# Patient Record
Sex: MALE | Race: WHITE
[De-identification: names, ages, dates, MRNs, and addresses within clinical notes are randomized per-mention and may not be internally consistent; named-entity substitution may affect disease eponyms.]

---

## 2018-04-19 ENCOUNTER — HOSPITAL ENCOUNTER (EMERGENCY)
Dept: HOSPITAL 63 - ER | Age: 33
Discharge: ANOTHER HOSPITAL | End: 2018-04-19
Payer: COMMERCIAL

## 2018-04-19 VITALS — BODY MASS INDEX: 42.24 KG/M2 | WEIGHT: 253.53 LBS | HEIGHT: 65 IN

## 2018-04-19 VITALS — DIASTOLIC BLOOD PRESSURE: 76 MMHG | SYSTOLIC BLOOD PRESSURE: 112 MMHG

## 2018-04-19 DIAGNOSIS — K56.690: Primary | ICD-10-CM

## 2018-04-19 LAB
ALBUMIN SERPL-MCNC: 3.6 G/DL (ref 3.4–5)
ALBUMIN/GLOB SERPL: 0.9 {RATIO} (ref 1–1.7)
ALP SERPL-CCNC: 53 U/L (ref 46–116)
ALT SERPL-CCNC: 53 U/L (ref 16–63)
ANION GAP SERPL CALC-SCNC: 7 MMOL/L (ref 6–14)
APTT PPP: YELLOW S
AST SERPL-CCNC: 26 U/L (ref 15–37)
BACTERIA #/AREA URNS HPF: 0 /HPF
BASOPHILS # BLD AUTO: 0 X10^3/UL (ref 0–0.2)
BASOPHILS NFR BLD: 0 % (ref 0–3)
BILIRUB SERPL-MCNC: 0.5 MG/DL (ref 0.2–1)
BILIRUB UR QL STRIP: (no result)
BUN/CREAT SERPL: 12 (ref 6–20)
CA-I SERPL ISE-MCNC: 15 MG/DL (ref 8–26)
CALCIUM SERPL-MCNC: 9.2 MG/DL (ref 8.5–10.1)
CHLORIDE SERPL-SCNC: 102 MMOL/L (ref 98–107)
CO2 SERPL-SCNC: 31 MMOL/L (ref 21–32)
CREAT SERPL-MCNC: 1.3 MG/DL (ref 0.7–1.3)
EOSINOPHIL NFR BLD: 1.9 X10^3/UL (ref 0–0.7)
EOSINOPHIL NFR BLD: 16 % (ref 0–3)
ERYTHROCYTE [DISTWIDTH] IN BLOOD BY AUTOMATED COUNT: 13.1 % (ref 11.5–14.5)
FIBRINOGEN PPP-MCNC: CLEAR MG/DL
GFR SERPLBLD BASED ON 1.73 SQ M-ARVRAT: 64 ML/MIN
GLOBULIN SER-MCNC: 3.9 G/DL (ref 2.2–3.8)
GLUCOSE SERPL-MCNC: 124 MG/DL (ref 70–99)
GLUCOSE UR STRIP-MCNC: (no result) MG/DL
HCT VFR BLD CALC: 47.9 % (ref 39–53)
HGB BLD-MCNC: 16.3 G/DL (ref 13–17.5)
LIPASE: 157 U/L (ref 73–393)
LYMPHOCYTES # BLD: 2.2 X10^3/UL (ref 1–4.8)
LYMPHOCYTES NFR BLD AUTO: 18 % (ref 24–48)
MCH RBC QN AUTO: 29 PG (ref 25–35)
MCHC RBC AUTO-ENTMCNC: 34 G/DL (ref 31–37)
MCV RBC AUTO: 85 FL (ref 79–100)
MONO #: 0.8 X10^3/UL (ref 0–1.1)
MONOCYTES NFR BLD: 7 % (ref 0–9)
NEUT #: 7.1 X10^3UL (ref 1.8–7.7)
NEUTROPHILS NFR BLD AUTO: 59 % (ref 31–73)
NITRITE UR QL STRIP: (no result)
PLATELET # BLD AUTO: 187 X10^3/UL (ref 140–400)
POTASSIUM SERPL-SCNC: 3.8 MMOL/L (ref 3.5–5.1)
PROT SERPL-MCNC: 7.5 G/DL (ref 6.4–8.2)
RBC # BLD AUTO: 5.66 X10^6/UL (ref 4.3–5.7)
RBC #/AREA URNS HPF: 0 /HPF (ref 0–2)
SODIUM SERPL-SCNC: 140 MMOL/L (ref 136–145)
SP GR UR STRIP: >=1.03
SQUAMOUS #/AREA URNS LPF: (no result) /LPF
UROBILINOGEN UR-MCNC: 0.2 MG/DL
WBC # BLD AUTO: 12 X10^3/UL (ref 4–11)
WBC #/AREA URNS HPF: (no result) /HPF (ref 0–4)

## 2018-04-19 PROCEDURE — 81001 URINALYSIS AUTO W/SCOPE: CPT

## 2018-04-19 PROCEDURE — 83690 ASSAY OF LIPASE: CPT

## 2018-04-19 PROCEDURE — 99285 EMERGENCY DEPT VISIT HI MDM: CPT

## 2018-04-19 PROCEDURE — 80053 COMPREHEN METABOLIC PANEL: CPT

## 2018-04-19 PROCEDURE — 74177 CT ABD & PELVIS W/CONTRAST: CPT

## 2018-04-19 PROCEDURE — 83605 ASSAY OF LACTIC ACID: CPT

## 2018-04-19 PROCEDURE — 96361 HYDRATE IV INFUSION ADD-ON: CPT

## 2018-04-19 PROCEDURE — 36415 COLL VENOUS BLD VENIPUNCTURE: CPT

## 2018-04-19 PROCEDURE — 85025 COMPLETE CBC W/AUTO DIFF WBC: CPT

## 2018-04-19 PROCEDURE — 96374 THER/PROPH/DIAG INJ IV PUSH: CPT

## 2018-04-19 RX ADMIN — ONDANSETRON ONE MG: 2 INJECTION INTRAMUSCULAR; INTRAVENOUS at 01:44

## 2018-04-19 RX ADMIN — SODIUM CHLORIDE ONE MLS/HR: 0.9 INJECTION, SOLUTION INTRAVENOUS at 01:44

## 2018-04-19 RX ADMIN — IOHEXOL ONE ML: 300 INJECTION, SOLUTION INTRAVENOUS at 01:17

## 2018-04-19 RX ADMIN — SODIUM CHLORIDE ONE MLS/HR: 0.9 INJECTION, SOLUTION INTRAVENOUS at 02:51

## 2018-04-19 NOTE — PHYS DOC
Adult General


HPI


HPI





Patient is a 32 year old M who presents with right lower quadrant abdominal 

pain that started tonight. Patient states the pain started periumbilical and 

has shooting pain to the right lower quadrant. Patient states that for the past 

2 weeks he's had diarrhea with abdominal cramps however that stopped for 

approximately 3 days and then tonight the pain started. Patient is nauseous 

with no vomiting. Patient denies any fevers. Patient denies any previous 

abdominal surgeries. Patient denies any chest pain returns of breath. Patient 

is no other complaints.





Review of Systems


Review of Systems


GEN: Denies fevers, chills, sweats


HEENT: Denies blurred vision, sore throat


CV: Denies chest pain


RESP: Denies shortness of air, cough


GI: Right lower quadrant abdominal pain with nausea


NEURO: Denies confusion, dizziness


MSK: Denies weakness, joint pain/swelling





All other systems were reviewed and found to be within normal limits, except as 

documented in this note.





Current Medications


Current Medications





Current Medications








 Medications


  (Trade)  Dose


 Ordered  Sig/Jaki  Start Time


 Stop Time Status Last Admin


Dose Admin


 


 Ondansetron HCl


  (Zofran)  4 mg  1X  ONCE  4/19/18 00:45


 4/19/18 00:46 UNV  


 


 


 Sodium Chloride  1,000 ml @ 


 1,000 mls/hr  1X  ONCE  4/19/18 00:45


 4/19/18 01:44 UNV  


 











Physical Exam


Physical Exam


GEN.:    No apparent distress.  Alert and oriented.


HEENT:    Head is normocephalic, atraumatic


NECK:    Supple.  


LUNGS:    CTAB.


HEART:    RRR, S1, S2 present.  Peripheral pulses intact


ABDOMEN:    Soft, tenderness palpation periumbilical and right lower quadrant 

tenderness to palpation with no rebound, no guarding, no abdominal distention.  

Positive bowel sounds.


EXTREMITIES:    Without any cyanosis.    


NEUROLOGIC:     Normal speech, normal tone


PSYCHIATRIC:    Normal affect, normal mood.


SKIN:   No ulcerations





Current Patient Data


Vital Signs





Laboratory Tests








Test


  4/19/18


01:00 4/19/18


01:15


 


White Blood Count 12.0 x10^3/uL  


 


Red Blood Count 5.66 x10^6/uL  


 


Hemoglobin 16.3 g/dL  


 


Hematocrit 47.9 %  


 


Mean Corpuscular Volume 85 fL  


 


Mean Corpuscular Hemoglobin 29 pg  


 


Mean Corpuscular Hemoglobin


Concent 34 g/dL 


  


 


 


Red Cell Distribution Width 13.1 %  


 


Platelet Count 187 x10^3/uL  


 


Neutrophils (%) (Auto) 59 %  


 


Lymphocytes (%) (Auto) 18 %  


 


Monocytes (%) (Auto) 7 %  


 


Eosinophils (%) (Auto) 16 %  


 


Basophils (%) (Auto) 0 %  


 


Neutrophils # (Auto) 7.1 x10^3uL  


 


Lymphocytes # (Auto) 2.2 x10^3/uL  


 


Monocytes # (Auto) 0.8 x10^3/uL  


 


Eosinophils # (Auto) 1.9 x10^3/uL  


 


Basophils # (Auto) 0.0 x10^3/uL  


 


Sodium Level 140 mmol/L  


 


Potassium Level 3.8 mmol/L  


 


Chloride Level 102 mmol/L  


 


Carbon Dioxide Level 31 mmol/L  


 


Anion Gap 7  


 


Blood Urea Nitrogen 15 mg/dL  


 


Creatinine 1.3 mg/dL  


 


Estimated GFR


(Cockcroft-Gault) 64.0 


  


 


 


BUN/Creatinine Ratio 12  


 


Glucose Level 124 mg/dL  


 


Calcium Level 9.2 mg/dL  


 


Total Bilirubin 0.5 mg/dL  


 


Aspartate Amino Transf


(AST/SGOT) 26 U/L 


  


 


 


Alanine Aminotransferase


(ALT/SGPT) 53 U/L 


  


 


 


Alkaline Phosphatase 53 U/L  


 


Total Protein 7.5 g/dL  


 


Albumin 3.6 g/dL  


 


Albumin/Globulin Ratio 0.9  


 


Lipase 157 U/L  


 


Urine Collection Type  Unknown 


 


Urine Color  Yellow 


 


Urine Clarity  Clear 


 


Urine pH  5.5 


 


Urine Specific Gravity  >=1.030 


 


Urine Protein  Neg 


 


Urine Glucose (UA)  Neg mg/dL 


 


Urine Ketones (Stick)  Neg mg/dL 


 


Urine Blood  Neg 


 


Urine Nitrite  Neg 


 


Urine Bilirubin  Neg 


 


Urine Urobilinogen Dipstick  0.2 mg/dL 


 


Urine Leukocyte Esterase  Neg 


 


Urine RBC  0 /HPF 


 


Urine WBC  Occ /HPF 


 


Urine Squamous Epithelial


Cells 


  None /LPF 


 


 


Urine Bacteria  0 /HPF 








Current Medications








 Medications


  (Trade)  Dose


 Ordered  Sig/Jaki


 Route


 PRN Reason  Start Time


 Stop Time Status Last Admin


Dose Admin


 


 Sodium Chloride  1,000 ml @ 


 1,000 mls/hr  1X  ONCE


 IV


   4/19/18 01:00


 4/19/18 02:00 DC 4/19/18 01:44


 


 


 Ondansetron HCl


  (Zofran)  4 mg  1X  ONCE


 IV


   4/19/18 01:00


 4/19/18 01:01 DC 4/19/18 01:44


 


 


 Iohexol


  (Omnipaque 300


 Mg/ml)  75 ml  1X  ONCE


 IV


   4/19/18 01:00


 4/19/18 01:01 DC 4/19/18 01:17


 


 


 Info


  (Do NOT chart on


 this entry -- for


 MONITORING)  1 each  PRN DAILY  PRN


 [facility]


 SEE COMMENTS  4/19/18 01:00


 4/21/18 00:59   


 











EKG


EKG


[]





Radiology/Procedures


Radiology/Procedures


CT Abd and Pelvis:


IMPRESSION:


 


No evidence of appendicitis or hydronephrosis.


 


There are some mildly dilated loops of small bowel in the upper abdomen 


with more distal decompression. This is a mild finding with the loops are 


only slightly dilated but would correlate with symptoms in the region to 


ensure that there is not a pathologic cause such as a partial small bowel 


obstruction. This could also  just be secondary to the phase of 


peristalsis.[]





Course & Med Decision Making


Course & Med Decision Making


Pertinent Labs and Imaging studies reviewed. (See chart for details)





ED course:


Patient was seen and examined emergency room basic blood work was ordered A CT 

scan abdomen pelvis


On reexamination patient still having periumbilical pain with right lower 

quadrant.


I made patient aware of CT findings and plan to place in observation for 

possible early appendicitis and a partial small bowel obstruction


0213: Discussed CC/HP/PMH with Dr. Whaley and recommends admit 


0230: Discussed CC/HP/PMH with Dr. Brandt and recommends admit and would like a 

lactic acid checked on the patient[][]





MDM:


After reviewing the chart, CC/HPI/PMH, physical exam, [lab results], [

radiological results], I have concerns base of the patient's physical exam 

findings and CT findings he could have early appendicitis or early partial 

small bowel junction therefore will admit the patient for observation at 

[facility] to medicine with surgery on consult. 


 





[]





Dragon Disclaimer


Dragon Disclaimer


This electronic medical record was generated, in whole or in part, using a 

voice recognition dictation system.





Departure


Departure:


Impression:  


 Primary Impression:  


 Right lower quadrant abdominal pain


 Additional Impression:  


 Partial small bowel obstruction


Disposition:  02 XFER [facility]OSP (Dr Whaley accepting )


Condition:  STABLE


Referrals:  


HAMMAD ORTIZ MD (PCP)





Problem Qualifiers











LEONARDO ALEGRIA DO Apr 19, 2018 00:45

## 2018-04-19 NOTE — RAD
INDICATION: RLQ Pain and tenderness, diarrhea<BR>Gave Omni 300 75ml IV - 

tolerated well<BR>no previous for comparison<BR>no hx of any abdominal 

surgeries or medical conditions

 

COMPARISON: None.

 

TECHNIQUE:

 

Axial CT images obtained through the abdomen and pelvis with contrast.

 

One or more of the following individualized dose reduction techniques were

utilized for this examination:  1. Automated exposure control;  2. 

Adjustment of the mA and/or kV according to patient size;  3. Use of 

iterative reconstruction technique.

 

FINDINGS:

 

Abdominal aorta is not aneurysmal.

Liver is low-attenuation. Fatty infiltration is possible

No peripancreatic fluid collection.

Spleen unremarkable.

No left-sided hydronephrosis.

Urinary bladder is decompressed.

No right-sided hydronephrosis.

The appendix is not inflamed.

There is some degenerative changes the spine with disc osteophyte 

complexes.

Very mild wedging of T10 and T11 vertebral bodies although these could be 

related to degenerative changes or congenital.

There are some prominent loops of small bowel within the upper abdomen 

measuring up to about 27 mm.

 

IMPRESSION:

 

No evidence of appendicitis or hydronephrosis.

 

There are some mildly dilated loops of small bowel in the upper abdomen 

with more distal decompression. This is a mild finding with the loops are 

only slightly dilated but would correlate with symptoms in the region to 

ensure that there is not a pathologic cause such as a partial small bowel 

obstruction. This could also  just be secondary to the phase of 

peristalsis.

 

Electronically signed by: Ashok Chakraborty MD (4/19/2018 1:59 AM) [facility]

## 2018-04-20 VITALS — SYSTOLIC BLOOD PRESSURE: 116 MMHG | DIASTOLIC BLOOD PRESSURE: 70 MMHG

## 2018-11-01 ENCOUNTER — HOSPITAL ENCOUNTER (OUTPATIENT)
Dept: HOSPITAL 61 - MRI | Age: 33
Discharge: HOME | End: 2018-11-01
Attending: INTERNAL MEDICINE
Payer: COMMERCIAL

## 2018-11-01 DIAGNOSIS — K76.0: Primary | ICD-10-CM

## 2018-11-01 DIAGNOSIS — Z90.49: ICD-10-CM

## 2018-11-01 PROCEDURE — 74181 MRI ABDOMEN W/O CONTRAST: CPT

## 2018-11-01 NOTE — RAD
MRI Of The Abdomen Without Intravenous Contrast:

 

History:  Abdominal pain status post cholecystectomy in June 2018.

 

Comparison:  None.

 

Technique: MRI of the abdomen was performed without intravenous contrast 

using multiple sequences and planes including 3-D respiratory triggered 

coronal MRCP sequence.  Rotating 3-D MIPS were created from the source 

MRCP data.  

 

Findings:

 

Severe fatty liver disease is seen.  No focal hepatic mass is identified.

Spleen is unremarkable as are bilateral adrenal glands.  Bilateral kidneys

are unremarkable.

 

Gallbladder is absent.  No biliary dilatation is identified.  Common bile 

duct has normal caliber at 3-4 mm.  There may be pancreas divisum.  No 

pancreatic inflammation is identified.

 

Impression:

1.  Status post cholecystectomy.

2.  No evidence of choledocholithiasis or biliary obstruction.

3.  Question pancreas divisum.

4.  Severe fatty liver disease.

 

Electronically signed by: Morteza Mcmahon MD (11/1/2018 4:07 PM) [facility]

## 2018-11-16 ENCOUNTER — HOSPITAL ENCOUNTER (OUTPATIENT)
Dept: HOSPITAL 61 - ENDOS | Age: 33
Discharge: HOME | End: 2018-11-16
Attending: INTERNAL MEDICINE
Payer: COMMERCIAL

## 2018-11-16 VITALS
SYSTOLIC BLOOD PRESSURE: 123 MMHG | DIASTOLIC BLOOD PRESSURE: 70 MMHG | SYSTOLIC BLOOD PRESSURE: 123 MMHG | DIASTOLIC BLOOD PRESSURE: 70 MMHG

## 2018-11-16 DIAGNOSIS — K21.0: Primary | ICD-10-CM

## 2018-11-16 DIAGNOSIS — Z83.79: ICD-10-CM

## 2018-11-16 DIAGNOSIS — Z90.49: ICD-10-CM

## 2018-11-16 DIAGNOSIS — Z72.89: ICD-10-CM

## 2018-11-16 DIAGNOSIS — Z98.890: ICD-10-CM

## 2018-11-16 DIAGNOSIS — Z88.2: ICD-10-CM

## 2018-11-16 DIAGNOSIS — Z79.899: ICD-10-CM

## 2018-11-16 DIAGNOSIS — Z88.0: ICD-10-CM

## 2018-11-16 DIAGNOSIS — Z83.3: ICD-10-CM

## 2018-11-16 LAB — HBA1C MFR BLD: 5.6 % (ref 4.8–5.6)

## 2018-11-16 PROCEDURE — 83036 HEMOGLOBIN GLYCOSYLATED A1C: CPT

## 2018-11-16 PROCEDURE — 82947 ASSAY GLUCOSE BLOOD QUANT: CPT

## 2018-11-16 PROCEDURE — 88305 TISSUE EXAM BY PATHOLOGIST: CPT

## 2018-11-16 PROCEDURE — 36415 COLL VENOUS BLD VENIPUNCTURE: CPT

## 2018-11-16 PROCEDURE — 43239 EGD BIOPSY SINGLE/MULTIPLE: CPT

## 2018-11-16 NOTE — CONS
DATE OF CONSULTATION:  11/16/2018



REFERRING PHYSICIAN:  Rinku Pimentel MD.



HISTORY OF PRESENT ILLNESS:  A 33-year-old  male with past medical

history significant for IBS, diarrhea, right upper quadrant abdominal pain,

status post cholecystectomy, is seen with ongoing pain since his cholecystectomy

and diarrhea has been improved by avoiding fatty and greasy foods.  With

continued symptoms, upper endoscopy was recommended to further assess.  There is

no family history of celiac disease, but there is a history of ulcerative

colitis with his mother and grandfather.  With continued symptoms, he is here

today.



PAST MEDICAL HISTORY:  IBS, gastroesophageal reflux disease, diarrhea and

abdominal pain.



ALLERGIES:  PENICILLIN AND SULFA.



MEDICATIONS:  Include Pepto-Bismol, Zyrtec, ibuprofen, loperamide.



FAMILY AND SOCIAL HISTORY:  Significant for diabetes with her mother and

ulcerative colitis with mother and grandfather.  He is a social drinker,

nonsmoker.



PAST SURGICAL HISTORY:  Status post tonsillectomy, cholecystectomy.



REVIEW OF SYSTEMS:  Per records.



PHYSICAL EXAMINATION:

GENERAL:  Reveals a well-nourished, well-developed  male.

VITAL SIGNS:  Temperature is 98.1, pulse 80, respirations 20.

HEENT:  Normocephalic and atraumatic.  Pupils and extraocular muscles are not

tested.  Sclerae anicteric.

NECK:  Supple.

LUNGS:  Clear.

CARDIOVASCULAR:  Reveals an S1, S2 without S3, S4 or appreciable murmur.

ABDOMEN:  Reveals soft abdomen, normal bowel sounds without appreciable

hepatosplenomegaly.

EXTREMITIES:  Reveals no cyanosis, clubbing or edema.



IMPRESSION:  Right upper quadrant abdominal pain, status post cholecystectomy,

etiology is to be determined.  Differential includes peptic ulcer disease

secondary to end-stage celiac disease, gastroparesis and/or malignancy.  Risks

and benefits of the procedure were discussed with the patient who is wiling to

proceed at this time.

 



______________________________

SHAR MENSAH MD



DR:  MARIO/chantel  JOB#:  [id number] / [id number]

DD:  11/16/2018 10:25  DT:  11/16/2018 21:19

## 2018-11-19 NOTE — PATHOLOGY
[facility] Accession Number: [id number]

.                                                                01

Material submitted:                                        .

DISTAL ESOPHAGUS

.                                                                01

Clinical history:                                          .

Abdominal pain, reflux rule out Warren's.

.                                                                02

**********************************************************************

Diagnosis:

Esophageal biopsies, distal esophagus:

- Segments of hyperplastic squamous esophageal mucosa and esophagogastric

mucosa showing chronic inflammation, consistent with reflux esophagitis.

.

(JPM:mml; 11/19/18)

[facility] access hospital/11/19/2018

**********************************************************************

.                                                                02

Comment:

Sections of the distal esophageal biopsy reveal segments of

tangentially-oriented hyperplastic squamous esophageal mucosa and

esophagogastric mucosa showing moderate chronic inflammation.  The

findings are consistent with reflux esophagitis.  There is no evidence of

Warren's change, dysplasia, or malignancy.

.

(JPM:mml; 11/19/18)

.                                                                02

Electronically signed:                                     .

Jhoan Peace MD, Pathologist

NPI- [id number]

.                                                                01

Gross description:                                         .

Received in formalin labeled "Shravan Brewer, distal esophagus" is a 0.8 x

0.6 x 0.2 cm aggregate of tan-white soft tissue fragments.  The specimen

is submitted in cassette A1.

([facility]; 11/17/2018)

SY/SY

.                                                                02

Pathologist provided ICD-10:

K21.0

.                                                                02

CPT                                                        .

790376

Specimen Comment: A courtesy copy of this report has been sent to

Specimen Comment: [phone], [phone].

Specimen Comment: Report sent to   / DR ROJAS

Specimen Comment: A duplicate report has been generated due to demographic updates.

***Performed at:  01

   [address], [place], [place]  [id number]

   MD Norris Burton MD Phone:  [phone]

***Performed at:  02

   [facility]

   [address] [place] [address]  [id number]

   MD Jhoan Peace MD Phone:  [phone]

## 2018-11-30 ENCOUNTER — HOSPITAL ENCOUNTER (OUTPATIENT)
Dept: HOSPITAL 61 - NM | Age: 33
Discharge: HOME | End: 2018-11-30
Attending: INTERNAL MEDICINE
Payer: COMMERCIAL

## 2018-11-30 DIAGNOSIS — K30: Primary | ICD-10-CM

## 2018-11-30 PROCEDURE — A9541 TC99M SULFUR COLLOID: HCPCS

## 2018-11-30 PROCEDURE — 78264 GASTRIC EMPTYING IMG STUDY: CPT

## 2018-11-30 NOTE — RAD
EXAM: Nuclear gastric emptying scan.

 

HISTORY: Right upper quadrant pain.

 

COMPARISON: None.

 

TECHNIQUE: Serial static images were obtained over the stomach following 

oral administration of 2.0 mCi of 99m-Tc sulfur colloid.

 

FINDINGS: The stomach empties into the small bowel without evidence of 

reflux in the area of the esophagus. The estimated time for half emptying 

of gastric contents, i.e. 'gastric emptying time' is 112 minutes (normal 

is 66 +/- 22 minutes). There is 73 percent retained tracer activity within

the stomach at one hour, 45 percent retained tracer activity within the 

stomach at 2 hours, 4 percent retained tracer activity within the stomach 

at 3 hours and 0 percent retained tracer activity within the stomach at 4 

hours.

 

IMPRESSION: Slightly delayed gastric emptying with a half-time of 112 

minutes.

 

Electronically signed by: Linda Bowens MD (11/30/2018 12:59 PM) 

[facility]

## 2019-02-28 ENCOUNTER — HOSPITAL ENCOUNTER (EMERGENCY)
Dept: HOSPITAL 61 - ER | Age: 34
Discharge: HOME | End: 2019-02-28
Payer: COMMERCIAL

## 2019-02-28 VITALS — WEIGHT: 260 LBS | HEIGHT: 66 IN | BODY MASS INDEX: 41.78 KG/M2

## 2019-02-28 VITALS — SYSTOLIC BLOOD PRESSURE: 128 MMHG | DIASTOLIC BLOOD PRESSURE: 76 MMHG

## 2019-02-28 DIAGNOSIS — Z88.2: ICD-10-CM

## 2019-02-28 DIAGNOSIS — Z88.0: ICD-10-CM

## 2019-02-28 DIAGNOSIS — K52.9: Primary | ICD-10-CM

## 2019-02-28 LAB
ALBUMIN SERPL-MCNC: 3.5 G/DL (ref 3.4–5)
ALBUMIN/GLOB SERPL: 0.8 {RATIO} (ref 1–1.7)
ALP SERPL-CCNC: 53 U/L (ref 46–116)
ALT SERPL-CCNC: 63 U/L (ref 16–63)
ANION GAP SERPL CALC-SCNC: 9 MMOL/L (ref 6–14)
APTT PPP: YELLOW S
AST SERPL-CCNC: 33 U/L (ref 15–37)
BACTERIA #/AREA URNS HPF: 0 /HPF
BASOPHILS # BLD AUTO: 0 X10^3/UL (ref 0–0.2)
BASOPHILS NFR BLD: 0 % (ref 0–3)
BILIRUB SERPL-MCNC: 0.5 MG/DL (ref 0.2–1)
BILIRUB UR QL STRIP: NEGATIVE
BUN SERPL-MCNC: 15 MG/DL (ref 8–26)
BUN/CREAT SERPL: 13 (ref 6–20)
CALCIUM SERPL-MCNC: 8.8 MG/DL (ref 8.5–10.1)
CHLORIDE SERPL-SCNC: 101 MMOL/L (ref 98–107)
CO2 SERPL-SCNC: 30 MMOL/L (ref 21–32)
CREAT SERPL-MCNC: 1.2 MG/DL (ref 0.7–1.3)
EOSINOPHIL NFR BLD: 0.3 X10^3/UL (ref 0–0.7)
EOSINOPHIL NFR BLD: 3 % (ref 0–3)
ERYTHROCYTE [DISTWIDTH] IN BLOOD BY AUTOMATED COUNT: 12.9 % (ref 11.5–14.5)
FIBRINOGEN PPP-MCNC: CLEAR MG/DL
GFR SERPLBLD BASED ON 1.73 SQ M-ARVRAT: 69.7 ML/MIN
GLOBULIN SER-MCNC: 4.2 G/DL (ref 2.2–3.8)
GLUCOSE SERPL-MCNC: 85 MG/DL (ref 70–99)
HCT VFR BLD CALC: 43.7 % (ref 39–53)
HGB BLD-MCNC: 14.7 G/DL (ref 13–17.5)
LIPASE: 98 U/L (ref 73–393)
LYMPHOCYTES # BLD: 1.5 X10^3/UL (ref 1–4.8)
LYMPHOCYTES NFR BLD AUTO: 20 % (ref 24–48)
MCH RBC QN AUTO: 29 PG (ref 25–35)
MCHC RBC AUTO-ENTMCNC: 34 G/DL (ref 31–37)
MCV RBC AUTO: 85 FL (ref 79–100)
MONO #: 0.8 X10^3/UL (ref 0–1.1)
MONOCYTES NFR BLD: 11 % (ref 0–9)
NEUT #: 4.8 X10^3UL (ref 1.8–7.7)
NEUTROPHILS NFR BLD AUTO: 65 % (ref 31–73)
NITRITE UR QL STRIP: NEGATIVE
PH UR STRIP: 5.5 [PH]
PLATELET # BLD AUTO: 181 X10^3/UL (ref 140–400)
POTASSIUM SERPL-SCNC: 3.9 MMOL/L (ref 3.5–5.1)
PROT SERPL-MCNC: 7.7 G/DL (ref 6.4–8.2)
PROT UR STRIP-MCNC: NEGATIVE MG/DL
RBC # BLD AUTO: 5.15 X10^6/UL (ref 4.3–5.7)
RBC #/AREA URNS HPF: 0 /HPF (ref 0–2)
SODIUM SERPL-SCNC: 140 MMOL/L (ref 136–145)
UROBILINOGEN UR-MCNC: 0.2 MG/DL
WBC # BLD AUTO: 7.4 X10^3/UL (ref 4–11)
WBC #/AREA URNS HPF: 0 /HPF (ref 0–4)

## 2019-02-28 PROCEDURE — 81001 URINALYSIS AUTO W/SCOPE: CPT

## 2019-02-28 PROCEDURE — 85025 COMPLETE CBC W/AUTO DIFF WBC: CPT

## 2019-02-28 PROCEDURE — 96361 HYDRATE IV INFUSION ADD-ON: CPT

## 2019-02-28 PROCEDURE — 74177 CT ABD & PELVIS W/CONTRAST: CPT

## 2019-02-28 PROCEDURE — 96375 TX/PRO/DX INJ NEW DRUG ADDON: CPT

## 2019-02-28 PROCEDURE — 83690 ASSAY OF LIPASE: CPT

## 2019-02-28 PROCEDURE — 80053 COMPREHEN METABOLIC PANEL: CPT

## 2019-02-28 PROCEDURE — 96374 THER/PROPH/DIAG INJ IV PUSH: CPT

## 2019-02-28 PROCEDURE — 36415 COLL VENOUS BLD VENIPUNCTURE: CPT

## 2019-02-28 PROCEDURE — 99284 EMERGENCY DEPT VISIT MOD MDM: CPT

## 2019-02-28 NOTE — PHYS DOC
Adult General


Chief Complaint


Chief Complaint:  ABDOMINAL PAIN





HPI


HPI





Patient is a 33-year-old male who presents with complaint of epigastric 

abdominal pain that started at about midnight tonight. Patient rates his pain 

to be a 7-8 out of 10. He reports nausea but is had no vomiting. He states that 

he has had a total of 10 bowel movements have been liquid. He denies seeing any 

blood in the stool. He describes pain in his abdomen as being cramping. He 

states that the pain does radiate to the right upper quadrant. He states that 

he did have his gallbladder out about 6 months ago. He denies any radiation 

into the back. Patient denies any fever, chest pain or shortness of breath.





Review of Systems


Review of Systems





Constitutional: Denies fever or chills []


Respiratory: Denies cough or shortness of breath []


Cardiovascular: No additional information not addressed in HPI []


GI: Complains of epigastric abdominal pain with nausea. Denies vomiting. 

Complains of diarrhea []


Musculoskeletal: Denies back pain or joint pain []





All other systems were reviewed and found to be within normal limits, except as 

documented in this note.





Current Medications


Current Medications





Current Medications








 Medications


  (Trade)  Dose


 Ordered  Sig/Jaki  Start Time


 Stop Time Status Last Admin


Dose Admin


 


 Fentanyl Citrate


  (Fentanyl 2ml


 Vial)  25 mcg  PRN Q15MIN  PRN  2/28/19 04:30


 3/1/19 04:29  2/28/19 04:49


25 MCG


 


 Info


  (CONTRAST GIVEN


 -- Rx MONITORING)  1 each  PRN DAILY  PRN  2/28/19 05:15


 3/2/19 05:14   





 


 Iohexol


  (Omnipaque 300


 Mg/ml)  75 ml  1X  ONCE  2/28/19 05:30


 2/28/19 05:31 DC 2/28/19 05:10


75 ML


 


 Ondansetron HCl


  (Zofran)  4 mg  1X  ONCE  2/28/19 05:00


 2/28/19 05:01 DC 2/28/19 04:48


4 MG


 


 Sodium Chloride  1,000 ml @ 


 1,000 mls/hr  Q1H  2/28/19 05:00


 2/28/19 05:59  2/28/19 04:49


1,000 MLS/HR











Allergies


Allergies





Allergies








Coded Allergies Type Severity Reaction Last Updated Verified


 


  Penicillins Allergy Intermediate Rash 11/16/18 Yes


 


  Sulfa (Sulfonamide Antibiotics) Allergy Intermediate Rash 11/16/18 Yes











Physical Exam


Physical Exam





Constitutional: Well developed, well nourished, no acute distress, non-toxic 

appearance. []


Neck: Normal range of motion, no tenderness, supple, no stridor. [] 


Cardiovascular:Heart rate regular rhythm, no murmur []


Lungs & Thorax:  Bilateral breath sounds clear to auscultation []


Abdomen: Bowel sounds normal, soft with epigastric tenderness. [] 


Skin: Warm, dry, no erythema, no rash. [] 


Extremities: No tenderness, no cyanosis, no clubbing, ROM intact, no edema. [] 


Neurologic: Alert and oriented X 3, no focal deficits noted. []





Current Patient Data


Vital Signs





 Vital Signs








  Date Time  Temp Pulse Resp B/P (MAP) Pulse Ox O2 Delivery O2 Flow Rate FiO2


 


2/28/19 04:10 98.1 82 14 142/83 (102) 99 Room Air  





 98.1       








Lab Values





 Laboratory Tests








Test


 2/28/19


04:30


 


White Blood Count


 7.4 x10^3/uL


(4.0-11.0)


 


Red Blood Count


 5.15 x10^6/uL


(4.30-5.70)


 


Hemoglobin


 14.7 g/dL


(13.0-17.5)


 


Hematocrit


 43.7 %


(39.0-53.0)


 


Mean Corpuscular Volume


 85 fL ()





 


Mean Corpuscular Hemoglobin 29 pg (25-35)  


 


Mean Corpuscular Hemoglobin


Concent 34 g/dL


(31-37)


 


Red Cell Distribution Width


 12.9 %


(11.5-14.5)


 


Platelet Count


 181 x10^3/uL


(140-400)


 


Neutrophils (%) (Auto) 65 % (31-73)  


 


Lymphocytes (%) (Auto) 20 % (24-48)  L


 


Monocytes (%) (Auto) 11 % (0-9)  H


 


Eosinophils (%) (Auto) 3 % (0-3)  


 


Basophils (%) (Auto) 0 % (0-3)  


 


Neutrophils # (Auto)


 4.8 x10^3uL


(1.8-7.7)


 


Lymphocytes # (Auto)


 1.5 x10^3/uL


(1.0-4.8)


 


Monocytes # (Auto)


 0.8 x10^3/uL


(0.0-1.1)


 


Eosinophils # (Auto)


 0.3 x10^3/uL


(0.0-0.7)


 


Basophils # (Auto)


 0.0 x10^3/uL


(0.0-0.2)


 


Sodium Level


 140 mmol/L


(136-145)


 


Potassium Level


 3.9 mmol/L


(3.5-5.1)


 


Chloride Level


 101 mmol/L


()


 


Carbon Dioxide Level


 30 mmol/L


(21-32)


 


Anion Gap 9 (6-14)  


 


Blood Urea Nitrogen


 15 mg/dL


(8-26)


 


Creatinine


 1.2 mg/dL


(0.7-1.3)


 


Estimated GFR


(Cockcroft-Gault) 69.7  





 


BUN/Creatinine Ratio 13 (6-20)  


 


Glucose Level


 85 mg/dL


(70-99)


 


Calcium Level


 8.8 mg/dL


(8.5-10.1)


 


Total Bilirubin


 0.5 mg/dL


(0.2-1.0)


 


Aspartate Amino Transferase


(AST) 33 U/L (15-37)





 


Alanine Aminotransferase (ALT)


 63 U/L (16-63)





 


Alkaline Phosphatase


 53 U/L


()


 


Total Protein


 7.7 g/dL


(6.4-8.2)


 


Albumin


 3.5 g/dL


(3.4-5.0)


 


Albumin/Globulin Ratio


 0.8 (1.0-1.7)


L


 


Lipase


 98 U/L


()





 Laboratory Tests


2/28/19 04:30








 Laboratory Tests


2/28/19 04:30











EKG


EKG


[]





Radiology/Procedures


Radiology/Procedures


[]


Impressions:


PROCEDURE: CT ABD PELV W/ IV CONTRST ONLY





EXAM: CT Abdomen and Pelvis with IV contrast


 


CLINICAL HISTORY: Upper abdominal pain 


 


COMPARISON: none


 


TECHNIQUE: Helical CT of the abdomen and pelvis was performed following 


the administration of intravenous contrast. Oral contrast was 


administered. Axial, coronal and sagittal reformatted images were 


generated.


 


PQRS compliance statement - One or more of the following individualized 


dose reduction techniques were utilized for this study:


1.  Automated exposure control


2.  Adjustment of the mA and/or kV according to patient size


3.  Use of iterative reconstruction technique


 


FINDINGS:


Lower chest: 


Subpleural linear opacities likely scarring/atelectasis.4 mm right lower 


lobe lung nodules are seen.


 


Abdomen and Pelvis: 


Diffuse hepatic hypoattenuation, hepatic steatosis. No focal liver lesion.


Liver is mildly enlarged measuring 18.7 cm in length. Accounting for 


postcholecystectomy change, no biliary ductal dilatation.


 


Spleen is unremarkable. Adrenal glands are normal. Pancreas is 


unremarkable. 


 


Symmetric nephrograms. No focal renal lesion. No hydronephrosis. 


 


Appendix is normal. No small or large bowel dilatation. No evidence for 


bowel obstruction. Mild colonic stool content is seen.


 


Partially distended bladder is unremarkable.


 


Small fat-containing periumbilical hernia is seen. No abdominal or pelvic 


ascites. No abdominal or pelvic lymphadenopathy.


 


Bones: 


Osseous structures are grossly unremarkable.


 


IMPRESSION: 


Mild hepatomegaly and hepatic steatosis


Accounting for postcholecystectomy change, no biliary ductal dilatation.


No evidence of bowel obstruction. Appendix is normal.


 


Electronically signed by: Bharath Wren MD (2/28/2019 5:48 AM) [facility]





Course & Med Decision Making


Course & Med Decision Making


Pertinent Labs and Imaging studies reviewed. (See chart for details)





[]





Dragon Disclaimer


Dragon Disclaimer


This electronic medical record was generated, in whole or in part, using a 

voice recognition dictation system.





Departure


Departure


Impression:  


 Primary Impression:  


 Gastroenteritis


Disposition:  01 HOME, SELF-CARE


Condition:  STABLE


Referrals:  


DERRICK ROJAS (PCP)


Patient Instructions:  Viral Gastroenteritis


Scripts


Diphenoxylate Hcl/Atropine (LOMOTIL TABLET) 1 Each Tablet


1 TAB PO TID PRN for DIARRHEA, #30 TAB


   Prov: RAUL OATES Jr. DO         2/28/19 


Ondansetron Hcl (ZOFRAN) 4 Mg Tablet


4 MG PO PRN TID PRN for NAUSEA, #15


   nausea/vomiting


   Prov: RAUL OATES Jr. DO         2/28/19











RAUL OATES Jr. DO Feb 28, 2019 04:30